# Patient Record
Sex: MALE
[De-identification: names, ages, dates, MRNs, and addresses within clinical notes are randomized per-mention and may not be internally consistent; named-entity substitution may affect disease eponyms.]

---

## 2017-05-12 PROBLEM — Z00.00 ENCOUNTER FOR PREVENTIVE HEALTH EXAMINATION: Status: ACTIVE | Noted: 2017-05-12

## 2019-12-13 ENCOUNTER — APPOINTMENT (OUTPATIENT)
Dept: ORTHOPEDIC SURGERY | Facility: CLINIC | Age: 49
End: 2019-12-13
Payer: COMMERCIAL

## 2019-12-13 VITALS
HEIGHT: 67 IN | OXYGEN SATURATION: 96 % | HEART RATE: 96 BPM | DIASTOLIC BLOOD PRESSURE: 80 MMHG | WEIGHT: 140 LBS | BODY MASS INDEX: 21.97 KG/M2 | SYSTOLIC BLOOD PRESSURE: 121 MMHG

## 2019-12-13 DIAGNOSIS — Z86.59 PERSONAL HISTORY OF OTHER MENTAL AND BEHAVIORAL DISORDERS: ICD-10-CM

## 2019-12-13 DIAGNOSIS — T14.8XXA OTHER INJURY OF UNSPECIFIED BODY REGION, INITIAL ENCOUNTER: ICD-10-CM

## 2019-12-13 DIAGNOSIS — Z60.2 PROBLEMS RELATED TO LIVING ALONE: ICD-10-CM

## 2019-12-13 DIAGNOSIS — F17.200 NICOTINE DEPENDENCE, UNSPECIFIED, UNCOMPLICATED: ICD-10-CM

## 2019-12-13 DIAGNOSIS — F19.90 OTHER PSYCHOACTIVE SUBSTANCE USE, UNSPECIFIED, UNCOMPLICATED: ICD-10-CM

## 2019-12-13 DIAGNOSIS — Z78.9 OTHER SPECIFIED HEALTH STATUS: ICD-10-CM

## 2019-12-13 DIAGNOSIS — Z80.42 FAMILY HISTORY OF MALIGNANT NEOPLASM OF PROSTATE: ICD-10-CM

## 2019-12-13 PROCEDURE — 99203 OFFICE O/P NEW LOW 30 MIN: CPT

## 2019-12-13 RX ORDER — LURASIDONE HYDROCHLORIDE 20 MG/1
20 TABLET, FILM COATED ORAL
Refills: 0 | Status: ACTIVE | COMMUNITY

## 2019-12-13 SDOH — SOCIAL STABILITY - SOCIAL INSECURITY: PROBLEMS RELATED TO LIVING ALONE: Z60.2

## 2019-12-16 ENCOUNTER — TRANSCRIPTION ENCOUNTER (OUTPATIENT)
Age: 49
End: 2019-12-16

## 2019-12-30 NOTE — HISTORY OF PRESENT ILLNESS
[de-identified] : Karan Cross in as active 50 yo gentleman who sustained a left knee injury one month ago while playing soccer. His foot slipped as he went to kick the ball. He felt a sharp pain on the lateral aspect of his knee. He had progressive swelling and limited ROM. He was seen by a local orthopedist and was sent for an MRI which shows a partial ACL tear. He has been wearing a hinged knee brace and just started supervised PT. His strength and ROM have improved. HIs knee feels stable. He denies any locking or buckling.

## 2019-12-30 NOTE — END OF VISIT
[FreeTextEntry3] : All medical record entries made by AIDEN Webb, acting as a scribe for this encounter under the direction of Francisco Chicas MD . I have reviewed the chart and agree that the record accurately reflects my personal performance of the history, physical exam, assessment and plan. I have also personally directed, reviewed, and agreed with the chart. \par \par

## 2019-12-30 NOTE — DISCUSSION/SUMMARY
[de-identified] : Mr Cross has a partial ACL tear and bony contusions on the lateral femoral condyle and lateral tibial plateau. He will continue on with supervised PT and will slowly wean out of his brace. We will see him back in one month for reevaluation. All questions were answered. he will call if any issues arise.

## 2019-12-30 NOTE — PHYSICAL EXAM
[de-identified] : The patient is a well developed, well nourished male in no apparent distress. He is alert and oriented X 3 with a pleasant mood and appropriate affect. \par \par On physical examination of the left knee, his ROM is 0-120 degrees. The patient walks with an antalgic gait assisted by a brace. There is trace effusion. No warmth or erythema is noted. The patella is non tender to palpation medially or laterally. There is no crepitus noted. The apprehension and grind tests are negative. The extensor mechanism is intact. There is mild lateral  joint line tenderness elicited on palpation. The Patrick sign is negative. The Lachman and pivot shift tests are negative. There is no varus or valgus laxity at 0 or 30 degrees. No posterolateral or anteromedial laxity is noted. No masses are palpable. No other soft tissue or bony tenderness is noted. Quadriceps weakness is noted. Neurovascular function is intact.   [de-identified] : MRI of the left knee shows a minimally impacted subchondral fracture of the lateral tibial plateau and a bone bruise on the lateral femoral condyle. There is a partial tear of the ACL noted

## 2020-01-14 ENCOUNTER — APPOINTMENT (OUTPATIENT)
Dept: ORTHOPEDIC SURGERY | Facility: CLINIC | Age: 50
End: 2020-01-14